# Patient Record
Sex: MALE | Race: WHITE
[De-identification: names, ages, dates, MRNs, and addresses within clinical notes are randomized per-mention and may not be internally consistent; named-entity substitution may affect disease eponyms.]

---

## 2018-10-14 ENCOUNTER — HOSPITAL ENCOUNTER (EMERGENCY)
Dept: HOSPITAL 62 - ER | Age: 48
Discharge: ANOTHER HOSPITAL | End: 2018-10-14
Payer: SELF-PAY

## 2018-10-14 VITALS — DIASTOLIC BLOOD PRESSURE: 116 MMHG | SYSTOLIC BLOOD PRESSURE: 180 MMHG

## 2018-10-14 DIAGNOSIS — S06.1X9A: Primary | ICD-10-CM
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
## 2018-10-14 DIAGNOSIS — T50.901A: ICD-10-CM

## 2018-10-14 DIAGNOSIS — R09.2: ICD-10-CM

## 2018-10-14 DIAGNOSIS — I46.9: ICD-10-CM

## 2018-10-14 LAB
ABSOLUTE LYMPHOCYTES# (MANUAL): 9.3 10^3/UL (ref 0.5–4.7)
ABSOLUTE MONOCYTES # (MANUAL): 0.6 10^3/UL (ref 0.1–1.4)
ABSOLUTE NEUTROPHILS# (MANUAL): 4.7 10^3/UL (ref 1.7–8.2)
ADD MANUAL DIFF: YES
ALBUMIN SERPL-MCNC: 3.2 G/DL (ref 3.5–5)
ALP SERPL-CCNC: 70 U/L (ref 38–126)
ALT SERPL-CCNC: 47 U/L (ref 21–72)
ANION GAP SERPL CALC-SCNC: 13 MMOL/L (ref 5–19)
APPEARANCE UR: (no result)
APTT PPP: YELLOW S
ARTERIAL BLOOD FIO2: (no result)
ARTERIAL BLOOD FIO2: (no result)
ARTERIAL BLOOD H2CO3: 1.23 MMOL/L (ref 1.05–1.35)
ARTERIAL BLOOD H2CO3: 2.02 MMOL/L (ref 1.05–1.35)
ARTERIAL BLOOD HCO3: 17.8 MMOL/L (ref 20–24)
ARTERIAL BLOOD HCO3: 19.9 MMOL/L (ref 20–24)
ARTERIAL BLOOD PCO2: 40.8 MMHG (ref 35–45)
ARTERIAL BLOOD PCO2: 67 MMHG (ref 35–45)
ARTERIAL BLOOD PH: 7.04 (ref 7.35–7.45)
ARTERIAL BLOOD PH: 7.31 (ref 7.35–7.45)
ARTERIAL BLOOD PO2: 180.2 MMHG (ref 80–100)
ARTERIAL BLOOD PO2: 302.5 MMHG (ref 80–100)
ARTERIAL BLOOD TOTAL CO2: 19.8 MMOL/L (ref 23–27)
ARTERIAL BLOOD TOTAL CO2: 21.2 MMOL/L (ref 23–27)
AST SERPL-CCNC: 58 U/L (ref 17–59)
BASE EXCESS BLDA CALC-SCNC: -13.5 MMOL/L
BASE EXCESS BLDA CALC-SCNC: -6 MMOL/L
BASOPHILS NFR BLD MANUAL: 1 % (ref 0–2)
BILIRUB DIRECT SERPL-MCNC: 0.2 MG/DL (ref 0–0.4)
BILIRUB SERPL-MCNC: 0.3 MG/DL (ref 0.2–1.3)
BILIRUB UR QL STRIP: NEGATIVE
BUN SERPL-MCNC: 18 MG/DL (ref 7–20)
CALCIUM: 8.5 MG/DL (ref 8.4–10.2)
CHLORIDE SERPL-SCNC: 102 MMOL/L (ref 98–107)
CK MB SERPL-MCNC: 1.31 NG/ML (ref ?–4.55)
CK SERPL-CCNC: 117 U/L (ref 55–170)
CO2 SERPL-SCNC: 23 MMOL/L (ref 22–30)
EOSINOPHIL NFR BLD MANUAL: 3 % (ref 0–6)
ERYTHROCYTE [DISTWIDTH] IN BLOOD BY AUTOMATED COUNT: 13.7 % (ref 11.5–14)
GLUCOSE SERPL-MCNC: 265 MG/DL (ref 75–110)
GLUCOSE UR STRIP-MCNC: 150 MG/DL
HCT VFR BLD CALC: 38.9 % (ref 37.9–51)
HGB BLD-MCNC: 12.5 G/DL (ref 13.5–17)
KETONES UR STRIP-MCNC: NEGATIVE MG/DL
LIPASE SERPL-CCNC: 136.4 U/L (ref 23–300)
MCH RBC QN AUTO: 28.9 PG (ref 27–33.4)
MCHC RBC AUTO-ENTMCNC: 32.1 G/DL (ref 32–36)
MCV RBC AUTO: 90 FL (ref 80–97)
MONOCYTES % (MANUAL): 4 % (ref 3–13)
NITRITE UR QL STRIP: NEGATIVE
PH UR STRIP: 6 [PH] (ref 5–9)
PLATELET # BLD: 286 10^3/UL (ref 150–450)
PLATELET COMMENT: ADEQUATE
POTASSIUM SERPL-SCNC: 4.5 MMOL/L (ref 3.6–5)
PROT SERPL-MCNC: 5.8 G/DL (ref 6.3–8.2)
PROT UR STRIP-MCNC: 100 MG/DL
RBC # BLD AUTO: 4.33 10^6/UL (ref 4.35–5.55)
SAO2 % BLDA: 99.1 % (ref 94–98)
SAO2 % BLDA: 99.4 % (ref 94–98)
SEGMENTED NEUTROPHILS % (MAN): 31 % (ref 42–78)
SODIUM SERPL-SCNC: 137.7 MMOL/L (ref 137–145)
SP GR UR STRIP: 1.01
TOTAL CELLS COUNTED BLD: 100
TROPONIN I SERPL-MCNC: 0.02 NG/ML
UROBILINOGEN UR-MCNC: NEGATIVE MG/DL (ref ?–2)
VARIANT LYMPHS NFR BLD MANUAL: 61 % (ref 13–45)
WBC # BLD AUTO: 15.2 10^3/UL (ref 4–10.5)

## 2018-10-14 PROCEDURE — 72125 CT NECK SPINE W/O DYE: CPT

## 2018-10-14 PROCEDURE — 93010 ELECTROCARDIOGRAM REPORT: CPT

## 2018-10-14 PROCEDURE — 82550 ASSAY OF CK (CPK): CPT

## 2018-10-14 PROCEDURE — 84484 ASSAY OF TROPONIN QUANT: CPT

## 2018-10-14 PROCEDURE — 83690 ASSAY OF LIPASE: CPT

## 2018-10-14 PROCEDURE — 85025 COMPLETE CBC W/AUTO DIFF WBC: CPT

## 2018-10-14 PROCEDURE — 70450 CT HEAD/BRAIN W/O DYE: CPT

## 2018-10-14 PROCEDURE — 84300 ASSAY OF URINE SODIUM: CPT

## 2018-10-14 PROCEDURE — 94660 CPAP INITIATION&MGMT: CPT

## 2018-10-14 PROCEDURE — 71045 X-RAY EXAM CHEST 1 VIEW: CPT

## 2018-10-14 PROCEDURE — 96374 THER/PROPH/DIAG INJ IV PUSH: CPT

## 2018-10-14 PROCEDURE — 99291 CRITICAL CARE FIRST HOUR: CPT

## 2018-10-14 PROCEDURE — 80053 COMPREHEN METABOLIC PANEL: CPT

## 2018-10-14 PROCEDURE — 99292 CRITICAL CARE ADDL 30 MIN: CPT

## 2018-10-14 PROCEDURE — 82803 BLOOD GASES ANY COMBINATION: CPT

## 2018-10-14 PROCEDURE — 81001 URINALYSIS AUTO W/SCOPE: CPT

## 2018-10-14 PROCEDURE — 93005 ELECTROCARDIOGRAM TRACING: CPT

## 2018-10-14 PROCEDURE — 82553 CREATINE MB FRACTION: CPT

## 2018-10-14 PROCEDURE — 36415 COLL VENOUS BLD VENIPUNCTURE: CPT

## 2018-10-14 RX ADMIN — FENTANYL CITRATE PRN MCG: 50 INJECTION INTRAMUSCULAR; INTRAVENOUS at 01:43

## 2018-10-14 RX ADMIN — MIDAZOLAM HYDROCHLORIDE PRN MG: 1 INJECTION, SOLUTION INTRAMUSCULAR; INTRAVENOUS at 01:57

## 2018-10-14 RX ADMIN — FENTANYL CITRATE PRN MCG: 50 INJECTION INTRAMUSCULAR; INTRAVENOUS at 02:12

## 2018-10-14 RX ADMIN — MIDAZOLAM HYDROCHLORIDE PRN MG: 1 INJECTION, SOLUTION INTRAMUSCULAR; INTRAVENOUS at 04:31

## 2018-10-14 RX ADMIN — MIDAZOLAM HYDROCHLORIDE PRN MG: 1 INJECTION, SOLUTION INTRAMUSCULAR; INTRAVENOUS at 02:16

## 2018-10-14 NOTE — RADIOLOGY REPORT (SQ)
EXAM DESCRIPTION: 



CT HEAD WITHOUT IV CONTRAST



COMPLETED DATE/TME:  10/14/2018 01:46



CLINICAL HISTORY: 



48 years, Male, post arrest possible anoxic injury



COMPARISON:

None.



TECHNIQUE:

Axial CT images of the right and rotated without contrast. DL

1028  Images stored on PACS.

 

All CT scanners at this facility use dose modulation, iterative

reconstruction, and/or weight based dosing when appropriate to

reduce radiation dose to as low as reasonably achievable (ALARA).





[facility]: Dose Right [facility]: CareDose   [facility]: Dose Right    [facility]:

Teradose 4D    [facility]: Smart Technologies



LIMITATIONS:

None.



FINDINGS:



There is diffuse cerebral edema with loss of sulcation. There is

no hemorrhage, midline shift, herniation, or hydrocephalus. There

is complete opacification of the left frontal sinus, anterior

left ethmoid air cells, and left maxillary sinus. The mastoid air

cells are clear. There is no acute fracture.



IMPRESSION:



Diffuse cerebral edema, likely related to anoxic brain injury.

 

TECHNICAL DOCUMENTATION:



Quality ID # [id number]: Final reports with documentation of one or more

dose reduction techniques (e.g., Automated exposure control,

adjustment of the mA and/or kV according to patient size, use of

iterative reconstruction technique)



 2011 [employer]- All Rights Reserved

## 2018-10-14 NOTE — ER DOCUMENT REPORT
ED Resuscitation





- General


Chief Complaint: Overdose


Stated Complaint: CARDIAC ARREST


Time Seen by Provider: 10/14/18 01:44





- HPI


Witnessed arrest: Yes


Bystander CPR?: Yes


Onset: Just prior to arrival





- Paramedics initial findings


Unresponsive: Completely


Respirations: No respirations


Rhythm: Asystole





- Pre-hospital treatment


Treatment: Bag-valve mask, CPR/thumper, Oxygen





Past Medical History





- General


Information source: Law Enforcement, Emergency Med Personnel


Cannot obtain history due to: Other - Unresponsive





- Social History


Smoking Status: Unknown if Ever Smoked


Family History: None





Review of Systems





- Review of Systems


-: Yes ROS unobtainable due to patient's medical condition





Physical Exam





- Vital signs


Vitals: 


 











Resp BP Pulse Ox


 


 31 H  130/76 H  99 


 


 10/14/18 01:39  10/14/18 01:39  10/14/18 01:39











Interpretation: Hypotensive





- General


General appearance: Unresponsive


In distress: Severe





- HEENT


Head: Normocephalic


Eyes: Normal


Conjunctiva: Normal


Cornea: Normal


Eyelashes: Normal


Pupils: Dilated, Fixed





- Respiratory


Respiratory status: Other - Ruben airway in place, no appreciable spontaneous 

breaths


Chest status: Nontender


Breath sounds: Normal


Chest palpation: Normal





- Cardiovascular


Rhythm: Regular


Heart sounds: Normal auscultation


Murmur: No





- Abdominal


Inspection: Normal


Distension: No distension


Tenderness: Other - small echymosis over the left abdomen





- Back


Back: Normal





- Extremities


General upper extremity: Normal inspection, Nontender, Normal strength, Normal 

temperature


General lower extremity: Normal inspection, Nontender, Normal strength, Normal 

temperature





- Neurological


Neuro grossly intact: No


[place] Coma Scale Eye Opening: None


Blank Coma Scale Verbal: None


Blank Coma Scale Motor: None


Blank Coma Scale Total: 3





- Skin


Skin Temperature: Warm


Skin Moisture: Dry


Skin Color: Pale





Course





- Re-evaluation


Re-evalutation: 





10/14/18 03:56


This 48-year-old man presented in extremis.


He was witnessed with 2 other individuals entering bathroom at which time they 

snorted some unknown intoxicant believe potentially to be heroin.


Thereafter he was witnessed to become unresponsive, he fell to the ground.


At that time the police were called.


Upon initial response 4 mg of Narcan were administered to the patient, he was 

pulseless he did not improve with Narcan and CPR was initiated, at that time 

EMS had arrived he had 4 minutes of CPR with administration of epinephrine, he 

was noted to be in asystole upon initial pulse and rhythm check.


Following administration of 2 rounds of CPR and epinephrine he had a return of 

circulation, he had a Ruben airway placed for ventilation and was subsequently 

transported, he received another 4 mg of Narcan while in route.


Upon arrival this gentleman was doing nothing purposefully, his pupils were 

fixed and dilated.


He had no response to noxious stimuli.  He was placed on monitor, he had access 

obtained through peripheral large bore IVs, 218-gauge.


His Ruben airway was subsequently transitioned to an ET tube without any 

paralytic administered.


Following initiation of ventilation via endotracheal tube his heart rate 

increased and his blood pressure increased.


A chest x-ray confirmed tube placement, because of his increasing apparent 

agitation via vital signs initiated sedation utilizing propofol as well as 

fentanyl and Versed.


Initial arterial blood gas demonstrated hyperoxia with a marked acidosis likely 

as a result of his downtime and CPR, down titrated his FiO2 from 60-40%.





His blood pressure improved from the 200+ systolic range with sedation.


His work of breathing began to override the ventilator with marked tachypnea to 

the range of 35, because of his agitation and occasional now movements in which 

he sat up in bed made determination to proceed with paralysis of this patient, 

administered vecuronium via IV 10 mg.


Patient was taken for CT imaging of the head as well as cervical spine.


CT imaging of the head demonstrated what appears to be cerebral edema, with 

probable anoxic injury.


Given that the patient however is beginning to breathe spontaneously and 

occasionally move believe this patient will require a higher level of care, he 

did have a cardiac arrest and EKG was obtained which shows poor R wave 

progression and Q waves in his inferior chest wall but no obvious acute changes.


Contacted [facility] who question whether or not patient might 

benefit from observation and admission and [place], because of the concern of 

the hospitalist here for lack of neurology as well as cardiology with 

intervention he deferred, contacted [facility] who does not have neurology 

as well contacted thereafter [facility] who noted that 

if patient did require higher level of care and [facility] did have capacity was 

most appropriate that they take the patient.


Upon consultation with the intensivist Dr. Thompson he agreed that patient may 

benefit from transfer for higher level of care and more specialty needs.


He agreed to accept this patient in transfer to the intensive care unit.


Because of his ongoing needs will plan for this patient undergo transfer [facility] under the care of Dr. Thompson.


At this time the patient's blood pressure is in the 120 systolic range with a 

heart rate in the 70s, he is synchronous with the vent with a pulse ox reading 

persistently in the  range.


At this time it is likely that this patient had a hypoxic respiratory arrest as 

a result of his use of narcotic medication potentially laced with a stronger 

narcotic, is unclear at this time what his neurologic status is as he is now 

been paralyzed and sedated though he had set up nearly with the ET tube in 

place.


We will plan for continued monitoring in emergency department until this 

patient is transported to [facility].


10/14/18 04:45


Repeat blood gas demonstrates an improvement in patient's acidosis, he is now 

essentially normal car back slightly hyperoxic, down titrated FiO2 from 40% to 

35%.


Have intermittently bolused Versed as well as fentanyl for sedation along with 

patient's propofol infusion which has been up and down titrated as necessary to 

obtain appropriate blood pressures and mental status.








- Vital Signs


Vital signs: 


 











Temp Pulse Resp BP Pulse Ox


 


       21 H  183/122 H  98 


 


       10/14/18 04:11  10/14/18 04:11  10/14/18 04:11














- Laboratory


Result Diagrams: 


 10/14/18 01:35





 10/14/18 01:35


Laboratory results interpreted by me: 


 











  10/14/18 10/14/18 10/14/18





  01:35 01:35 01:41


 


WBC  15.2 H  


 


RBC  4.33 L  


 


Hgb  12.5 L  


 


Seg Neuts % (Manual)  31 L  


 


Lymphocytes % (Manual)  61 H  


 


Abs Lymphs (Manual)  9.3 H  


 


Carbonic Acid    2.02 H


 


ABG pH    7.04 L*


 


ABG pCO2    67.0 H


 


ABG pO2    302.5 H


 


ABG HCO3    17.8 L


 


ABG Total CO2    19.8 L


 


ABG O2 Saturation    99.4 H


 


Creatinine   1.40 H 


 


Est GFR (Non-Af Amer)   54 L 


 


Glucose   265 H 


 


Total Protein   5.8 L 


 


Albumin   3.2 L 


 


Urine Protein   


 


Urine Glucose (UA)   


 


Urine Blood   


 


Urine Sodium   














  10/14/18 10/14/18 10/14/18





  01:49 01:49 04:27


 


WBC   


 


RBC   


 


Hgb   


 


Seg Neuts % (Manual)   


 


Lymphocytes % (Manual)   


 


Abs Lymphs (Manual)   


 


Carbonic Acid   


 


ABG pH    7.31 L


 


ABG pCO2   


 


ABG pO2    180.2 H


 


ABG HCO3    19.9 L


 


ABG Total CO2    21.2 L


 


ABG O2 Saturation    99.1 H


 


Creatinine   


 


Est GFR (Non-Af Amer)   


 


Glucose   


 


Total Protein   


 


Albumin   


 


Urine Protein  100 H  


 


Urine Glucose (UA)  150 H  


 


Urine Blood  MODERATE H  


 


Urine Sodium   133 H 














Procedures





- Intubation


  ** Orotracheal


Airway evaluation: Normal anatomy


Mallampati Classification: Class 2


Intubation method: Orotracheal


Blade type: Radha


Blade size: 3


Equipment used: Glidescope


ETT size: 8.0


ETT secured at: Teeth


ETT secured at (cm): 25


Breath Sounds after Intubation: Equal


End tidal CO2 confirmed: Yes


Post Intubation Xray: Yes


Intubation Complications: No complications





Critical Care Note





- Critical Care Note


Total time excluding time spent on procedures (mins): 75





Discharge





- Discharge


Clinical Impression: 


 Respiratory arrest, Cardiac arrest, Cerebral edema due to anoxia





Overdose


Qualifiers:


 Encounter type: initial encounter Injury intent: accidental or unintentional 

Qualified Code(s): T50.901A - Poisoning by unspecified drugs, medicaments and 

biological substances, accidental (unintentional), initial encounter





Condition: Critical


Disposition: Psychiatric hospital

## 2018-10-14 NOTE — RADIOLOGY REPORT (SQ)
EXAM DESCRIPTION:

CT CERVICAL SPINE WITHOUT IV CONTRAST



COMPLETED DATE/TME:  10/14/2018 01:46



CLINICAL HISTORY:

48 years Male, possible fall and arrest



Comparison: None.



Technique:  No contrast.  Coronal and sagittal reformat.  This

exam was performed according to our departmental

dose-optimization program, which includes automated exposure

control, adjustment of the mA and/or kV according to patient size

and/or use of iterative reconstruction technique.[facility]: Dose Right

[facility]: CareDose   [facility]: Dose Right    [facility]: Teradose 4D    [facility]:

[employer]



LIMITATIONS:

None



Findings: 



Moderate right sixth foraminal stenosis due to spondylosis.

Partially imaged endotracheal tube. Normal alignment. Normal

curvature. No fracture. Normal vertebral heights. Partially

imaged nuchal soft tissues, inferior cranium, and upper thorax

appear otherwise grossly intact.



IMPRESSION: No acute findings.

## 2018-10-14 NOTE — RADIOLOGY REPORT (SQ)
Chest single view on  10/14/2018 at 2:03 AM 



CLINICAL INDICATION: Intubated



COMPARISON: None



FINDINGS: Multiple overlying wires are noted. ET tube tip is in

the midthoracic trachea approximately 4.1 cm above the level of

the jane. The lungs are clear. Cardiac, hilar and mediastinal

contours are within normal limits. Pulmonary vascularity is

within normal limits.



IMPRESSION: No acute disease. radiology results/lab results

## 2018-10-15 NOTE — EKG REPORT
SEVERITY:- ABNORMAL ECG -

SINUS TACHYCARDIA

INFERIOR INFARCT, AGE INDETERMINATE

PROLONGED QT INTERVAL

NONSPECIFIC LATERAL ST CHANGES

:

Confirmed by: Subhash Dodd MD 15-Oct-2018 07:52:19